# Patient Record
Sex: MALE | URBAN - METROPOLITAN AREA
[De-identification: names, ages, dates, MRNs, and addresses within clinical notes are randomized per-mention and may not be internally consistent; named-entity substitution may affect disease eponyms.]

---

## 2023-08-16 ENCOUNTER — APPOINTMENT (RX ONLY)
Dept: URBAN - METROPOLITAN AREA CLINIC 77 | Facility: CLINIC | Age: 68
Setting detail: DERMATOLOGY
End: 2023-08-16

## 2023-08-16 DIAGNOSIS — L71.8 OTHER ROSACEA: ICD-10-CM | Status: INADEQUATELY CONTROLLED

## 2023-08-16 PROCEDURE — ? CHRONOLOGY OF PRESENT ILLNESS

## 2023-08-16 PROCEDURE — ? COUNSELING

## 2023-08-16 PROCEDURE — ? PRESCRIPTION

## 2023-08-16 PROCEDURE — ? PRESCRIPTION MEDICATION MANAGEMENT

## 2023-08-16 PROCEDURE — 99204 OFFICE O/P NEW MOD 45 MIN: CPT

## 2023-08-16 RX ORDER — AZELAIC ACID 0.15 G/G
THIN LAYER GEL TOPICAL BID
Qty: 50 | Refills: 1 | Status: ERX | COMMUNITY
Start: 2023-08-16

## 2023-08-16 RX ADMIN — AZELAIC ACID THIN LAYER: 0.15 GEL TOPICAL at 00:00

## 2023-08-16 ASSESSMENT — LOCATION DETAILED DESCRIPTION DERM: LOCATION DETAILED: NASAL DORSUM

## 2023-08-16 ASSESSMENT — LOCATION ZONE DERM: LOCATION ZONE: NOSE

## 2023-08-16 ASSESSMENT — LOCATION SIMPLE DESCRIPTION DERM: LOCATION SIMPLE: NOSE

## 2023-08-16 NOTE — PROCEDURE: PRESCRIPTION MEDICATION MANAGEMENT
Detail Level: Simple
Plan: AM:\\nGentle cleanser \\nAzelaic acid \\nMoisturizer + spf\\n\\nPM: \\ngentle cleanser\\nAzelaic acid\\nMoisturizer
Render In Strict Bullet Format?: No
Initiate Treatment: azelaic acid 15 % topical gel BID\\nSig: Apply pea sized amount to entire face morning and night

## 2023-08-16 NOTE — PROCEDURE: CHRONOLOGY OF PRESENT ILLNESS
Detail Level: Zone
Chronology Of Present Illness: 8/16/23\\nPatient reports rash that comes and goes for more than a year, worsening. Patient reports previous use of metronidazole, Rosacare, and hydrocortisone. Patient states that these medications did not improve his condition. Advised that this is a chronic condition and may continue to come and go. Discussed treatment options such as oral antibiotic or topicals to help maintain condition. Advised patient be careful in the sun as this can be a trigger, recommended sunscreen 30+ daily. Advised that alcohol, caffeine, spicy foods, heat, etc can also trigger. Patient opts out of oral antibiotics for now, will prescribe azelaic acid to use BID. Recommended non-flushing niacinamide 500mg daily on [place].

## 2023-09-14 ENCOUNTER — APPOINTMENT (RX ONLY)
Dept: URBAN - METROPOLITAN AREA CLINIC 77 | Facility: CLINIC | Age: 68
Setting detail: DERMATOLOGY
End: 2023-09-14

## 2023-09-14 DIAGNOSIS — L71.8 OTHER ROSACEA: ICD-10-CM

## 2023-09-14 PROCEDURE — 99214 OFFICE O/P EST MOD 30 MIN: CPT

## 2023-09-14 PROCEDURE — ? PRESCRIPTION

## 2023-09-14 PROCEDURE — ? CHRONOLOGY OF PRESENT ILLNESS

## 2023-09-14 PROCEDURE — ? PRESCRIPTION MEDICATION MANAGEMENT

## 2023-09-14 PROCEDURE — ? COUNSELING

## 2023-09-14 RX ORDER — DOXYCYCLINE HYCLATE 50 MG/1
TABLET CAPSULE, GELATIN COATED ORAL QD
Qty: 30 | Refills: 1 | Status: ERX | COMMUNITY
Start: 2023-09-14

## 2023-09-14 RX ADMIN — DOXYCYCLINE HYCLATE TABLET: 50 CAPSULE, GELATIN COATED ORAL at 00:00

## 2023-09-14 ASSESSMENT — LOCATION SIMPLE DESCRIPTION DERM: LOCATION SIMPLE: NOSE

## 2023-09-14 ASSESSMENT — LOCATION ZONE DERM: LOCATION ZONE: NOSE

## 2023-09-14 ASSESSMENT — LOCATION DETAILED DESCRIPTION DERM: LOCATION DETAILED: NASAL DORSUM

## 2023-09-14 NOTE — PROCEDURE: CHRONOLOGY OF PRESENT ILLNESS
Detail Level: Zone
Chronology Of Present Illness: 8/16/23\\nPatient reports rash that comes and goes for more than a year, worsening. Patient reports previous use of metronidazole, Rosacare, and hydrocortisone. Patient states that these medications did not improve his condition. Advised that this is a chronic condition and may continue to come and go. Discussed treatment options such as oral antibiotic or topicals to help maintain condition. Advised patient be careful in the sun as this can be a trigger, recommended sunscreen 30+ daily. Advised that alcohol, caffeine, spicy foods, heat, etc can also trigger. Patient opts out of oral antibiotics for now, will prescribe azelaic acid to use BID. Recommended non-flushing niacinamide 500mg daily on [place]. \\n\\n9/14/23\\nPatient reports his condition has worsened since last visit despite using the azelaic acid and recommended treatment plan. Upon exam it appears he is very dry on the nose, and still flared. Rediscussed oral doxycycline, patient would like to try. will prescribe oral doxycycline and given samples of plexion to use once a day working up to twice per day. Patient to call if he likes topical for full prescription. if does not like, he is to let us know as well so that we can send in compound.

## 2023-09-14 NOTE — PROCEDURE: PRESCRIPTION MEDICATION MANAGEMENT
Detail Level: Simple
Render In Strict Bullet Format?: No
Discontinue Regimen: azelaic acid 15 % topical gel BID\\nSig: Apply pea sized amount to entire face morning and night
Initiate Treatment: Plexion  9.8% 4.8%\\nDoxycycline 50 mg
Samples Given: Plexion

## 2023-11-14 ENCOUNTER — RX ONLY (OUTPATIENT)
Age: 68
Setting detail: RX ONLY
End: 2023-11-14

## 2023-11-14 RX ORDER — SULFACETAMIDE SODIUM, SULFUR 100; 50 MG/G; MG/G
THIN LAYER CREAM TOPICAL BID
Qty: 57 | Refills: 0 | Status: ERX | COMMUNITY
Start: 2023-11-14